# Patient Record
(demographics unavailable — no encounter records)

---

## 2025-05-19 NOTE — PLAN
[FreeTextEntry1] : annual: pap and hpv  may TTC d/w pt horizon, info given, may  f/u with her partner reg period has seen PMD slightly high cholesterol LDL

## 2025-05-19 NOTE — HISTORY OF PRESENT ILLNESS
[Patient reported PAP Smear was normal] : Patient reported PAP Smear was normal [N] : Patient denies prior pregnancies [Normal Amount/Duration] :  normal amount and duration [Menstrual Cramps] : menstrual cramps [Currently Active] : currently active [Men] : men [Yes] : Yes [Condoms] : Condoms [No] : never pregnant [PapSmeardate] : 2022 [FreeTextEntry1] : 05/01/2025

## 2025-05-19 NOTE — PHYSICAL EXAM
[MA] : MA [Appropriately responsive] : appropriately responsive [Alert] : alert [No Acute Distress] : no acute distress [No Lymphadenopathy] : no lymphadenopathy [Regular Rate Rhythm] : regular rate rhythm [No Murmurs] : no murmurs [Clear to Auscultation B/L] : clear to auscultation bilaterally [Soft] : soft [Non-tender] : non-tender [Non-distended] : non-distended [No HSM] : No HSM [No Lesions] : no lesions [No Mass] : no mass [Oriented x3] : oriented x3 [Examination Of The Breasts] : a normal appearance [No Masses] : no breast masses were palpable [Labia Majora] : normal [Labia Minora] : normal [Normal] : normal [Uterine Adnexae] : normal [FreeTextEntry2] : [place]